# Patient Record
Sex: FEMALE | Race: WHITE | NOT HISPANIC OR LATINO | Employment: UNEMPLOYED | URBAN - METROPOLITAN AREA
[De-identification: names, ages, dates, MRNs, and addresses within clinical notes are randomized per-mention and may not be internally consistent; named-entity substitution may affect disease eponyms.]

---

## 2022-06-27 DIAGNOSIS — O26.20 HIGH RISK PREGNANCY DUE TO RECURRENT MISCARRIAGE: Primary | ICD-10-CM

## 2022-07-07 ENCOUNTER — OFFICE VISIT (OUTPATIENT)
Dept: MATERNAL FETAL MEDICINE | Facility: CLINIC | Age: 27
End: 2022-07-07
Payer: MEDICAID

## 2022-07-07 VITALS
BODY MASS INDEX: 30.73 KG/M2 | SYSTOLIC BLOOD PRESSURE: 134 MMHG | WEIGHT: 180 LBS | OXYGEN SATURATION: 99 % | DIASTOLIC BLOOD PRESSURE: 76 MMHG | HEART RATE: 126 BPM | HEIGHT: 64 IN | RESPIRATION RATE: 18 BRPM

## 2022-07-07 DIAGNOSIS — O26.20 HIGH RISK PREGNANCY DUE TO RECURRENT MISCARRIAGE: ICD-10-CM

## 2022-07-07 PROCEDURE — 99203 PR OFFICE/OUTPT VISIT, NEW, LEVL III, 30-44 MIN: ICD-10-PCS | Mod: 25,TH,S$GLB, | Performed by: OBSTETRICS & GYNECOLOGY

## 2022-07-07 PROCEDURE — 3008F BODY MASS INDEX DOCD: CPT | Mod: CPTII,S$GLB,, | Performed by: OBSTETRICS & GYNECOLOGY

## 2022-07-07 PROCEDURE — 76811 PR US, OB FETAL EVAL & EXAM, TRANSABDOM,FIRST GESTATION: ICD-10-PCS | Mod: S$GLB,,, | Performed by: OBSTETRICS & GYNECOLOGY

## 2022-07-07 PROCEDURE — 3075F SYST BP GE 130 - 139MM HG: CPT | Mod: CPTII,S$GLB,, | Performed by: OBSTETRICS & GYNECOLOGY

## 2022-07-07 PROCEDURE — 1159F MED LIST DOCD IN RCRD: CPT | Mod: CPTII,S$GLB,, | Performed by: OBSTETRICS & GYNECOLOGY

## 2022-07-07 PROCEDURE — 3078F DIAST BP <80 MM HG: CPT | Mod: CPTII,S$GLB,, | Performed by: OBSTETRICS & GYNECOLOGY

## 2022-07-07 PROCEDURE — 76817 TRANSVAGINAL US OBSTETRIC: CPT | Mod: S$GLB,,, | Performed by: OBSTETRICS & GYNECOLOGY

## 2022-07-07 PROCEDURE — 3008F PR BODY MASS INDEX (BMI) DOCUMENTED: ICD-10-PCS | Mod: CPTII,S$GLB,, | Performed by: OBSTETRICS & GYNECOLOGY

## 2022-07-07 PROCEDURE — 99203 OFFICE O/P NEW LOW 30 MIN: CPT | Mod: 25,TH,S$GLB, | Performed by: OBSTETRICS & GYNECOLOGY

## 2022-07-07 PROCEDURE — 3075F PR MOST RECENT SYSTOLIC BLOOD PRESS GE 130-139MM HG: ICD-10-PCS | Mod: CPTII,S$GLB,, | Performed by: OBSTETRICS & GYNECOLOGY

## 2022-07-07 PROCEDURE — 76811 OB US DETAILED SNGL FETUS: CPT | Mod: S$GLB,,, | Performed by: OBSTETRICS & GYNECOLOGY

## 2022-07-07 PROCEDURE — 3078F PR MOST RECENT DIASTOLIC BLOOD PRESSURE < 80 MM HG: ICD-10-PCS | Mod: CPTII,S$GLB,, | Performed by: OBSTETRICS & GYNECOLOGY

## 2022-07-07 PROCEDURE — 76817 PR US, OB, TRANSVAG APPROACH: ICD-10-PCS | Mod: S$GLB,,, | Performed by: OBSTETRICS & GYNECOLOGY

## 2022-07-07 PROCEDURE — 1159F PR MEDICATION LIST DOCUMENTED IN MEDICAL RECORD: ICD-10-PCS | Mod: CPTII,S$GLB,, | Performed by: OBSTETRICS & GYNECOLOGY

## 2022-07-07 NOTE — PROGRESS NOTES
Initial [facility] consultation note    Reason for consultation    1. Pregnancy at 18 weeks 5 days gestation  2. Abnormal MTHFR  3. History of LEEP procedure    Dear Dr. Wooten,    This is an initial visit for your patient to the Maternal-Fetal Medicine Service of [facility].  Your patient is a 27-year-old  8 para 1062 at 18 weeks and 5 days gestation.  Our team appreciate your request for imaging consultation secondary to a history of a LEEP procedure, and and abnormal MTHFR assay.    Past medical history:    The patient denies major medical illnesses.  She describes herself as healthy.  She is overweight.    Surgical history:  LEEP.  Dilation curettage x3.  Prior .    Family history:  Noncontributory.  Negative for open neural tube defect, congenital cardiac disease, aneuploid, and single gene disorders    Social history:  Negative for tobacco use, alcohol use, and recreational drug use during pregnancy    Review of systems:  The patient has no somatic complaints today.  Specifically, she denies vaginal bleeding, leakage of fluid.    Physical examination  Vital signs:  Blood pressure 134/76, pulse 126, respirations 18, weight 180 lb, pulse oximetry 99%  HEENT:  Grossly within normal limits  Abdomen:  Benign exam.  Uterus nontender to palpation  Extremities:  No ankle edema is palpable    Imaging:  Imaging was performed today no abnormalities noted.  Some views were limited by early gestation and maternal habitus.  Views of the outflow tracts, aortic arch, and facial region were suboptimal.  The fluid volume is normal.  There is no evidence placenta previa.  Abdominally we screen the cervical length and was only 30 mm.  Therefore we proceeded with a vaginal probe assessment of cervix.  It showed an anatomically normal cervix and a length a 35 mm.  The internal cervical os was T shaped.    Impression/recommendations:    The patient was counseled that the MTHFR assay is not clinically significant.   ACOG technical bulletins suggest we discontinue this assessment as it is not predictive of adverse pregnancy outcome.  This includes the 1st trimester and also at the end of the pregnancy.  The patient was given reassurance that there is no reason for concern based upon her abnormal result.  She expressed understanding.    The patient had a LEEP procedure.  Most studies indicate that this does not increase risk of cervical insufficiency and secondary premature birth.  However, it is appropriate to screen abdominally.  That measurement was below the threshold of 3.5 cm to call the cervix normal in length.  Today's vaginal probe assessment is more reassuring at a value of 3.5 cm.  This is a totally normal assessment.  The probability of spontaneous  birth is lessened.    Your patient was counseled that some views of anatomy were suboptimal due to early gestation.  I did recommend a follow-up study in 6 or 7 weeks.  At that time we will be able to reassess anatomy and hopefully complete the anatomic survey.  If that is possible, then the [facility] team will likely be able forego care for the remainder the pregnancy.    Please call me if you have any question about the care your patient.    Sincerely, Dr. Gilliam

## 2022-07-07 NOTE — LETTER
July 7, 2022        Mohinder Wooten MD  [address]   [place] [address]             [place] - Maternal Fetal Medicine  [address]  [place] [address]  Phone: [phone]  Fax: [phone]   Patient: Saadia Potts   MR Number: [id number]   YOB: 1995   Date of Visit: 7/7/2022       Dear Dr. Wooten:    Thank you for referring Saadia Potts to me for evaluation. Attached you will find relevant portions of my assessment and plan of care.    If you have questions, please do not hesitate to call me. I look forward to following Saadia Potts along with you.    Sincerely,      Willem Akhtar MD        CC  No Recipients    Enclosure         
Clothing

## 2022-07-07 NOTE — PROGRESS NOTES
"Saadia is here for initial MFM consultation, referred by Dr. Je Jr. for history of LEEP, + MTHFR, and recurrent miscarriages.    She is feeling fetal flutter movement.    Saadia denies vaginal bleeding, loss of fluid, recurrent cramping.      Vitals:    07/07/22 1121   BP: 134/76   Pulse: (!) 126   Resp: 18   SpO2: 99%   Weight: 81.6 kg (180 lb)   Height: 5' 4" (1.626 m)      BMI:                    30.9 kg/m^2               "

## 2022-08-09 DIAGNOSIS — O26.20 HIGH RISK PREGNANCY DUE TO RECURRENT MISCARRIAGE: Primary | ICD-10-CM

## 2022-08-18 ENCOUNTER — PROCEDURE VISIT (OUTPATIENT)
Dept: MATERNAL FETAL MEDICINE | Facility: CLINIC | Age: 27
End: 2022-08-18
Payer: MEDICAID

## 2022-08-18 VITALS
RESPIRATION RATE: 18 BRPM | SYSTOLIC BLOOD PRESSURE: 130 MMHG | WEIGHT: 182 LBS | OXYGEN SATURATION: 98 % | DIASTOLIC BLOOD PRESSURE: 84 MMHG | BODY MASS INDEX: 31.24 KG/M2 | HEART RATE: 120 BPM

## 2022-08-18 DIAGNOSIS — O26.20 HIGH RISK PREGNANCY DUE TO RECURRENT MISCARRIAGE: ICD-10-CM

## 2022-08-18 PROCEDURE — 99213 PR OFFICE/OUTPT VISIT, EST, LEVL III, 20-29 MIN: ICD-10-PCS | Mod: TH,25,S$GLB, | Performed by: OBSTETRICS & GYNECOLOGY

## 2022-08-18 PROCEDURE — 99213 OFFICE O/P EST LOW 20 MIN: CPT | Mod: TH,25,S$GLB, | Performed by: OBSTETRICS & GYNECOLOGY

## 2022-08-18 PROCEDURE — 76816 OB US FOLLOW-UP PER FETUS: CPT | Mod: S$GLB,,, | Performed by: OBSTETRICS & GYNECOLOGY

## 2022-08-18 PROCEDURE — 76816 PR  US,PREGNANT UTERUS,F/U,TRANSABD APP: ICD-10-PCS | Mod: S$GLB,,, | Performed by: OBSTETRICS & GYNECOLOGY

## 2022-08-18 NOTE — LETTER
August 18, 2022        Mohinder Wooten MD  [address] [facility] [address]             [place] - Maternal Fetal Medicine  [address]  [place] [address]  Phone: [phone]  Fax: [phone]   Patient: Saadia Potts   MR Number: [id number]   YOB: 1995   Date of Visit: 8/18/2022       Dear Dr. Wooten:    Thank you for referring Saadia Potts to me for evaluation. Below are the relevant portions of my assessment and plan of care.            If you have questions, please do not hesitate to call me. I look forward to following Saadia along with you.    Sincerely,      Niraj Armenta MD           CC  Niraj Armenta MD

## 2022-08-18 NOTE — PROGRESS NOTES
Saadia is here for followup [facility] consultation for + MSAFP and history of LEEP, referred by Dr. Je Jr.    She is feeling fetal movement.    Saadia denies vaginal bleeding, loss of fluid, recurrent contractions.    Vitals:    08/18/22 1101   BP: 130/84   Pulse: (!) 120   Resp: 18   SpO2: 98%   Weight: 82.6 kg (182 lb)     BMI:                    31.24 kg/m^2

## 2022-08-18 NOTE — PROGRESS NOTES
Indications for the follow-up Maternal-Fetal Medicine evaluation:  1.  Pregnancy at 24 weeks and 5 days   2.  Maternal MTHFR and a history of a LEEP procedure.  3. A history of 6 first-trimester miscarriages.    Thank you very much for asking us see your patient again.  She returns today for follow-up assessment because of being positive for the MTHFR mutation and having a LEEP procedure.  She reports no contractions, vaginal bleeding, or rupture the membranes.  She is feeling fetal movement.  The patient herself has never had any abnormal clotting events in her life time.    Physical findings:   In general is a healthy-appearing female in no distress.  Blood pressure is 130/84 and pulse 120.  Her face is not edematous and eyes are nonicteric.  The abdominal exam is benign without hepatosplenomegaly or uterine tenderness.  Peripheral edema is not noted.  Reflexes are 1+ equal bilaterally.    Ultrasound findings:  Please note that a separate ultrasound report will be provided to you with a detailed description of our findings today.  A brief summary that a single fetus is seen.  Adequate amniotic fluid is present.  The fetus was actively moving had a normal heart rate.  I was able to clearly see the entire fetal anatomy, and everything appears normal.  Biometric measurements were suggestive of normal fetal growth.  No placental abnormalities were detected.  Finally, the cervix was seen abdominally and appears long and closed.  It measured 33 mm in length.  No funneling is noted.      Impression:  Everything appeared to be progressing well a patient who is positive for the MTHFR mutation.  Clinical studies of population based the patient is have failed to show any adverse pregnancy outcomes in patients who have a positive MTHFR, who have never had any abnormal clotting events in her life time.  So in my opinion this is something that does not require any specific treatment, and we would not expect this to be any  problems for the developing fetus or the mother during her pregnancy.      She has a history of LEEP procedure.  This can be associated with a slightly increased risk for cervical incompetence, but at this time there is no suggestion of this.  The cervix is long and closed.    This patient has had 6 first-trimester miscarriages.  Since she is made thus far in the pregnancy with no apparent complications, what ever caused her miscarriages in the past should not be any issue, since everything has progressed this far, and everything appears completely normal.    Recommendations:  1.  In my opinion this patient can be seen with prenatal care only, and no further Maternal-Fetal Medicine evaluation be necessary.  Multiple first-trimester miscarriages are said to be a risk for  delivery.  So I would watch her for any signs or symptoms of threatening to deliver early, but this is done in all pregnancies any way.  2.  I did not schedule her for any return visits, but if you encounter unexpected problems between now and delivery that would require our assistance, please send her back and we would be happy to see her again.      Thanks again for the referral.  Please give us a call with any questions.

## 2025-04-14 ENCOUNTER — OFFICE VISIT (OUTPATIENT)
Dept: OBSTETRICS AND GYNECOLOGY | Facility: CLINIC | Age: 30
End: 2025-04-14
Payer: COMMERCIAL

## 2025-04-14 VITALS
WEIGHT: 199.38 LBS | SYSTOLIC BLOOD PRESSURE: 119 MMHG | BODY MASS INDEX: 34.23 KG/M2 | DIASTOLIC BLOOD PRESSURE: 82 MMHG | HEART RATE: 93 BPM

## 2025-04-14 DIAGNOSIS — Z01.419 WELL WOMAN EXAM WITH ROUTINE GYNECOLOGICAL EXAM: Primary | ICD-10-CM

## 2025-04-14 DIAGNOSIS — Z11.51 ENCOUNTER FOR SCREENING FOR HUMAN PAPILLOMAVIRUS (HPV): ICD-10-CM

## 2025-04-14 PROCEDURE — 3008F BODY MASS INDEX DOCD: CPT | Mod: CPTII,,, | Performed by: STUDENT IN AN ORGANIZED HEALTH CARE EDUCATION/TRAINING PROGRAM

## 2025-04-14 PROCEDURE — 99395 PREV VISIT EST AGE 18-39: CPT | Mod: S$PBB,,, | Performed by: STUDENT IN AN ORGANIZED HEALTH CARE EDUCATION/TRAINING PROGRAM

## 2025-04-14 PROCEDURE — 3074F SYST BP LT 130 MM HG: CPT | Mod: CPTII,,, | Performed by: STUDENT IN AN ORGANIZED HEALTH CARE EDUCATION/TRAINING PROGRAM

## 2025-04-14 PROCEDURE — 1159F MED LIST DOCD IN RCRD: CPT | Mod: CPTII,,, | Performed by: STUDENT IN AN ORGANIZED HEALTH CARE EDUCATION/TRAINING PROGRAM

## 2025-04-14 PROCEDURE — 3079F DIAST BP 80-89 MM HG: CPT | Mod: CPTII,,, | Performed by: STUDENT IN AN ORGANIZED HEALTH CARE EDUCATION/TRAINING PROGRAM

## 2025-04-14 PROCEDURE — 1160F RVW MEDS BY RX/DR IN RCRD: CPT | Mod: CPTII,,, | Performed by: STUDENT IN AN ORGANIZED HEALTH CARE EDUCATION/TRAINING PROGRAM

## 2025-04-14 RX ORDER — PANTOPRAZOLE SODIUM 40 MG/1
TABLET, DELAYED RELEASE ORAL
COMMUNITY

## 2025-04-14 NOTE — PROGRESS NOTES
Chief Complaint: Annual Exam    HPI: 30 y.o.  here for Annual Exam.  Patient doing well today.  She has no complaints at this time.  She reports her cycles are monthly with mild bleeding.  She is on medication for cycle regulation.  She had her tubes tied for contraception.    Review of Systems   Constitutional:  Negative for chills and fever.   HENT:  Negative for congestion and sore throat.    Respiratory:  Negative for cough and shortness of breath.    Cardiovascular:  Negative for chest pain and palpitations.   Gastrointestinal:  Negative for abdominal pain, nausea and vomiting.   Genitourinary:  Negative for dysuria, frequency and urgency.   Musculoskeletal:  Negative for back pain.   Skin:  Negative for itching and rash.   Neurological:  Negative for headaches.   All other systems reviewed and are negative.      Past Medical History anxiety  Past Surgical History:   Procedure Laterality Date    CERVICAL BIOPSY  W/ LOOP ELECTRODE EXCISION  2018     SECTION  2017    twins    DILATION AND CURETTAGE OF UTERUS      x 3    SALPINGECTOMY Bilateral      Past OB History:   Past Gyn History: + h/o prior abnormal pap smears, denies STD's, menstrual history as above.   Contraception History: tubal ligation  Social History: denies t/d/e  Family History + breast cancer - MGM, denies colon, ovarian, or uterine cancer  Allergies: NKDA  Current Outpatient Medications   Medication Instructions    pantoprazole (PROTONIX) 40 MG tablet for 90 Days     Physical Exam:  Vitals:    25 1303   BP: 119/82   Pulse: 93     Body mass index is 34.23 kg/m².  Physical Exam  Vitals reviewed. Exam conducted with a chaperone present.   Constitutional:       General: She is not in acute distress.     Appearance: Normal appearance.   HENT:      Head: Normocephalic and atraumatic.   Eyes:      Extraocular Movements: Extraocular movements intact.      Pupils: Pupils are equal, round, and reactive to light.    Pulmonary:      Effort: Pulmonary effort is normal. No respiratory distress.   Chest:   Breasts:     Breasts are symmetrical.      Right: No inverted nipple, mass, nipple discharge, skin change or tenderness.      Left: No inverted nipple, mass, nipple discharge, skin change or tenderness.   Abdominal:      General: There is no distension.      Palpations: Abdomen is soft.      Tenderness: There is no abdominal tenderness.   Genitourinary:     Comments: Normal external female genitalia, no masses or lesions, vagina pink with rugated, no vaginal bleeding or discharge, not friable cervix  Musculoskeletal:         General: No swelling or tenderness. Normal range of motion.      Cervical back: Normal range of motion and neck supple.   Lymphadenopathy:      Upper Body:      Right upper body: No supraclavicular or axillary adenopathy.      Left upper body: No supraclavicular or axillary adenopathy.   Skin:     General: Skin is warm and dry.   Neurological:      General: No focal deficit present.      Mental Status: She is alert and oriented to person, place, and time.          ASSESSMENT:   Patient is a 30 y.o.  who presents for annual exam   Problem List[1]    PLAN:  Pap today  Pre-conception counseling- folic acid and PNV  Counseling on self-breast exams, diet, and exercise.  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

## 2025-04-16 LAB — Lab: NORMAL
